# Patient Record
Sex: FEMALE | Race: WHITE
[De-identification: names, ages, dates, MRNs, and addresses within clinical notes are randomized per-mention and may not be internally consistent; named-entity substitution may affect disease eponyms.]

---

## 2017-03-16 ENCOUNTER — HOSPITAL ENCOUNTER (OUTPATIENT)
Dept: HOSPITAL 62 - SC | Age: 57
Discharge: HOME | End: 2017-03-16
Attending: OPHTHALMOLOGY
Payer: COMMERCIAL

## 2017-03-16 DIAGNOSIS — Z79.1: ICD-10-CM

## 2017-03-16 DIAGNOSIS — H25.813: Primary | ICD-10-CM

## 2017-03-16 DIAGNOSIS — Z79.84: ICD-10-CM

## 2017-03-16 DIAGNOSIS — Z88.8: ICD-10-CM

## 2017-03-16 DIAGNOSIS — M19.90: ICD-10-CM

## 2017-03-16 DIAGNOSIS — E11.9: ICD-10-CM

## 2017-03-16 PROCEDURE — 66984 XCAPSL CTRC RMVL W/O ECP: CPT

## 2017-03-16 PROCEDURE — 08RJ3JZ REPLACEMENT OF RIGHT LENS WITH SYNTHETIC SUBSTITUTE, PERCUTANEOUS APPROACH: ICD-10-PCS | Performed by: OPHTHALMOLOGY

## 2017-03-16 PROCEDURE — V2632 POST CHMBR INTRAOCULAR LENS: HCPCS

## 2017-03-16 PROCEDURE — 82962 GLUCOSE BLOOD TEST: CPT

## 2017-03-16 RX ADMIN — BESIFLOXACIN PRN DROP: 6 SUSPENSION OPHTHALMIC at 08:58

## 2017-03-16 RX ADMIN — TROPICAMIDE PRN DROP: 10 SOLUTION/ DROPS OPHTHALMIC at 08:58

## 2017-03-16 RX ADMIN — SODIUM CHONDROITIN SULFATE / SODIUM HYALURONATE ONE EACH: 0.55-0.5 INJECTION INTRAOCULAR at 09:51

## 2017-03-16 RX ADMIN — SODIUM CHONDROITIN SULFATE / SODIUM HYALURONATE ONE EACH: 0.55-0.5 INJECTION INTRAOCULAR at 00:00

## 2017-03-16 RX ADMIN — CYCLOPENTOLATE HYDROCHLORIDE AND PHENYLEPHRINE HYDROCHLORIDE PRN DROP: 2; 10 SOLUTION/ DROPS OPHTHALMIC at 08:58

## 2017-03-16 RX ADMIN — TROPICAMIDE PRN DROP: 10 SOLUTION/ DROPS OPHTHALMIC at 08:51

## 2017-03-16 RX ADMIN — BESIFLOXACIN PRN DROP: 6 SUSPENSION OPHTHALMIC at 00:00

## 2017-03-16 RX ADMIN — TETRACAINE HYDROCHLORIDE PRN DROP: 5 SOLUTION OPHTHALMIC at 09:32

## 2017-03-16 RX ADMIN — CYCLOPENTOLATE HYDROCHLORIDE AND PHENYLEPHRINE HYDROCHLORIDE PRN DROP: 2; 10 SOLUTION/ DROPS OPHTHALMIC at 09:07

## 2017-03-16 RX ADMIN — TROPICAMIDE PRN DROP: 10 SOLUTION/ DROPS OPHTHALMIC at 09:07

## 2017-03-16 RX ADMIN — BESIFLOXACIN PRN DROP: 6 SUSPENSION OPHTHALMIC at 09:59

## 2017-03-16 RX ADMIN — TETRACAINE HYDROCHLORIDE PRN DROP: 5 SOLUTION OPHTHALMIC at 09:22

## 2017-03-16 RX ADMIN — TETRACAINE HYDROCHLORIDE PRN DROP: 5 SOLUTION OPHTHALMIC at 08:51

## 2017-03-16 RX ADMIN — CYCLOPENTOLATE HYDROCHLORIDE AND PHENYLEPHRINE HYDROCHLORIDE PRN DROP: 2; 10 SOLUTION/ DROPS OPHTHALMIC at 08:51

## 2017-03-16 RX ADMIN — BESIFLOXACIN PRN DROP: 6 SUSPENSION OPHTHALMIC at 08:51

## 2017-03-16 NOTE — [facility] OPERATIVE REPORT E
[facility] Operative Report



NAME: CHUY CARRINGTON

                                      MRN: [id number]

                             AGE: 56Y

DATE OF SURGERY: 03/16/2017           ROOM:



PREOPERATIVE DIAGNOSIS:

CATARACT, RIGHT EYE.



POSTOPERATIVE DIAGNOSIS:

CATARACT, RIGHT EYE.



OPERATION:

Cataract extraction with intraocular lens implant of the right eye.



SURGEON:

MYRON PURCELL M.D.



ANESTHESIA:

Topical.



PROCEDURE:

After obtaining appropriate consent, the patient's right eye was prepped

and draped in sterile fashion as well as the surgeon in a sterile manner

and cataract surgery was started.  First a paracentesis blade was used to

make a small side-port incision.  Viscoelastic was used to inflate the

anterior chamber.  Next a 2.4 mm incision was made with the paracentesis

blade.  A continuous capsulorrhexis incision was made using a cystotome and

Utrata forceps.  Following this hydrodissection was carried out to make the

lens fully loose and mobile and it was rotated 90 degrees.  Following this,

a divide-and-conquer technique was used to phacoemulsify the lens with a

CDE of 8.95. The remaining cortex was removed with irrigation/aspiration. 

Provisc was instilled into the capsular bag to inflate the bag.  A SN60WF,

23.5 diopter lens was placed.  The remaining viscoelastic material was

removed with irrigation/aspiration.  Following this, a 10-0 nylon suture

was used to close the incision and it was found to be watertight.  Vigamox

was instilled in the eye and a protective shield was placed over the eye. 

The patient returned to the postoperative recovery in stable condition.





DICTATING PHYSICIAN: MYRON PURCELL M.D.



1284M              DT: 03/16/2017 1852

PHY#: 2011         DD: 03/16/2017 1751

ID:   [id number]               JOB#: [id number]       ACCT: [id number]



cc:MYRON PURCELL M.D.

>

## 2017-03-16 NOTE — DISCHARGE SUMMARY E
Discharge Summary



NAME: CHUY CARRINGTON

MRN:  [id number]        : 1960     AGE: 56Y

ADMITTED: 2017                  DISCHARGED: 2017



REASON FOR ADMISSION:

This is a 56-year-old female who underwent cataract extraction of the

right eye.



DIAGNOSIS:

Cataract, right eye.



HOSPITAL COURSE:

She underwent surgery because she has glare from headlights at night

making it difficult to drive, trouble seeing small print.  She should be

on a regular diet.  No bending at her waist.  No heavy lifting.  She

should use her Besivance, Ilevro, and Durezol at 3:00 p.m. and 8:00 p.m.

and sleep with a rigid shield, and I will see her for a 1-day

postoperative tomorrow.





DICTATING PHYSICIAN:  MYRON PURCELL M.D.





1284M                  DT: 2017    1853

PHY#: 2011            DD: 2017    1751

ID:   [id number]           JOB#: [id number]      ACCT: [id number]



cc:MYRON PURCELL M.D.

>

## 2017-04-11 ENCOUNTER — HOSPITAL ENCOUNTER (OUTPATIENT)
Dept: HOSPITAL 62 - SC | Age: 57
Discharge: HOME | End: 2017-04-11
Attending: OPHTHALMOLOGY
Payer: COMMERCIAL

## 2017-04-11 DIAGNOSIS — Z96.1: ICD-10-CM

## 2017-04-11 DIAGNOSIS — E11.9: ICD-10-CM

## 2017-04-11 DIAGNOSIS — M19.90: ICD-10-CM

## 2017-04-11 DIAGNOSIS — H25.812: Primary | ICD-10-CM

## 2017-04-11 DIAGNOSIS — Z79.84: ICD-10-CM

## 2017-04-11 PROCEDURE — 66984 XCAPSL CTRC RMVL W/O ECP: CPT

## 2017-04-11 PROCEDURE — V2632 POST CHMBR INTRAOCULAR LENS: HCPCS

## 2017-04-11 PROCEDURE — 08RK3JZ REPLACEMENT OF LEFT LENS WITH SYNTHETIC SUBSTITUTE, PERCUTANEOUS APPROACH: ICD-10-PCS | Performed by: OPHTHALMOLOGY

## 2017-04-11 PROCEDURE — 82962 GLUCOSE BLOOD TEST: CPT

## 2017-04-11 RX ADMIN — CYCLOPENTOLATE HYDROCHLORIDE AND PHENYLEPHRINE HYDROCHLORIDE PRN DROP: 2; 10 SOLUTION/ DROPS OPHTHALMIC at 07:44

## 2017-04-11 RX ADMIN — SODIUM CHONDROITIN SULFATE / SODIUM HYALURONATE ONE EACH: 0.55-0.5 INJECTION INTRAOCULAR at 08:31

## 2017-04-11 RX ADMIN — TETRACAINE HYDROCHLORIDE PRN DROP: 5 SOLUTION OPHTHALMIC at 07:43

## 2017-04-11 RX ADMIN — CYCLOPENTOLATE HYDROCHLORIDE AND PHENYLEPHRINE HYDROCHLORIDE PRN DROP: 2; 10 SOLUTION/ DROPS OPHTHALMIC at 07:56

## 2017-04-11 RX ADMIN — TETRACAINE HYDROCHLORIDE PRN DROP: 5 SOLUTION OPHTHALMIC at 08:22

## 2017-04-11 RX ADMIN — EPINEPHRINE ONE MG: 1 INJECTION, SOLUTION, CONCENTRATE INTRAVENOUS at 00:00

## 2017-04-11 RX ADMIN — BESIFLOXACIN PRN DROP: 6 SUSPENSION OPHTHALMIC at 08:12

## 2017-04-11 RX ADMIN — TETRACAINE HYDROCHLORIDE PRN DROP: 5 SOLUTION OPHTHALMIC at 08:12

## 2017-04-11 RX ADMIN — BESIFLOXACIN PRN DROP: 6 SUSPENSION OPHTHALMIC at 08:47

## 2017-04-11 RX ADMIN — BESIFLOXACIN PRN DROP: 6 SUSPENSION OPHTHALMIC at 00:00

## 2017-04-11 RX ADMIN — HEPARIN SODIUM ONE ML: 1000 INJECTION, SOLUTION INTRAVENOUS; SUBCUTANEOUS at 08:31

## 2017-04-11 RX ADMIN — CYCLOPENTOLATE HYDROCHLORIDE AND PHENYLEPHRINE HYDROCHLORIDE PRN DROP: 2; 10 SOLUTION/ DROPS OPHTHALMIC at 08:12

## 2017-04-11 RX ADMIN — TROPICAMIDE PRN DROP: 10 SOLUTION/ DROPS OPHTHALMIC at 07:56

## 2017-04-11 RX ADMIN — EPINEPHRINE ONE MG: 1 INJECTION, SOLUTION, CONCENTRATE INTRAVENOUS at 08:31

## 2017-04-11 RX ADMIN — HEPARIN SODIUM ONE ML: 1000 INJECTION, SOLUTION INTRAVENOUS; SUBCUTANEOUS at 00:00

## 2017-04-11 RX ADMIN — TROPICAMIDE PRN DROP: 10 SOLUTION/ DROPS OPHTHALMIC at 07:44

## 2017-04-11 RX ADMIN — BESIFLOXACIN PRN DROP: 6 SUSPENSION OPHTHALMIC at 07:45

## 2017-04-11 RX ADMIN — TROPICAMIDE PRN DROP: 10 SOLUTION/ DROPS OPHTHALMIC at 08:11

## 2017-04-11 RX ADMIN — SODIUM CHONDROITIN SULFATE / SODIUM HYALURONATE ONE EACH: 0.55-0.5 INJECTION INTRAOCULAR at 00:00

## 2017-04-11 NOTE — [facility] DISCHARGE SUMMARY E
[facility] Discharge Summary



NAME: CHUY CARRINGTON

                                      MRN: [id number]

                                AGE: 56Y

ADMITTED: 04/11/2017           DISCHARGED: 04/11/2017



HOSPITAL COURSE:

This is a 56-year-old female who underwent cataract extraction of her left

eye.



DIAGNOSIS:

Cataract, left eye.



INDICATIONS:

She underwent surgery because she was having difficulty feeling unbalanced

since having her right surgery.  She also had trouble seeing the TV and

reading.



DISCHARGE INSTRUCTIONS:

She should be on a regular diet.  No bending at her waist.  No heavy

lifting.  She should use her Besivance, Ilevro, and Durezol at 3 p.m. and 8

p.m. and sleep with a rigid shield.  I will see her for her 1 day

postoperative tomorrow.

 







DICTATING PHYSICIAN: MYRON PURCELL M.D.



1211M              DT: 04/11/2017 1217

Y#: 2011         DD: 04/11/2017 1216

ID:   [id number]               JOB#: [id number]       ACCT: [id number]



cc:MYRON PURCELL M.D.

>

## 2017-04-11 NOTE — [facility]PERAT[facility] REPORT E
[facility] Operative Report



NAME: CHUY CARRINGTON

                                      MRN: [id number]

                             AGE: 56Y

DATE OF SURGERY: 04/11/2017           ROOM:



PREOPERATIVE DIAGNOSIS:

Cataract, left eye.



POSTOPERATIVE DIAGNOSIS:

Cataract, left eye.



OPERATION:

Cataract extraction with intraocular lens implant of the left eye.



SURGEON:

MYRON PURCELL M.D.



ANESTHESIA:

Topical.



PROCEDURE:

After obtaining appropriate consent, the patient's left eye was prepped and

draped in sterile fashion as well as the surgeon in a sterile manner and

cataract surgery was started.  First a paracentesis blade was used to make

a small side-port incision.  Viscoelastic was used to inflate the anterior

chamber.  Next a 2.4 mm incision was made with the paracentesis blade.  A

continuous capsulorrhexis incision was made using a cystotome and Utrata

forceps.  Following this hydrodissection was carried out to make the lens

fully loose and mobile and it was rotated 90 degrees.  Following this, a

divide-and-conquer technique was used to phacoemulsify the lens with a CDE

of 7.36. The remaining cortex was removed with irrigation/aspiration. 

Provisc was instilled into the capsular bag to inflate the bag.  A SN60WF,

23.5 diopter lens was placed.  The remaining viscoelastic material was

removed with irrigation/aspiration.  Following this, a 10-0 nylon suture

was used to close the incision and it was found to be watertight.  Vigamox

was instilled in the eye and a protective shield was placed over the eye. 

The patient returned to the postoperative recovery in stable condition.









DICTATING PHYSICIAN: MYRON PURCELL M.D.



1211M              DT: 04/11/2017 1216

Y#: 2011         DD: 04/11/2017 1216

ID:   [id number]               JOB#: [id number]       ACCT: [id number]



cc:MYRON PURCELL M.D.

>

## 2018-03-16 ENCOUNTER — HOSPITAL ENCOUNTER (OUTPATIENT)
Dept: HOSPITAL 62 - RAD | Age: 58
End: 2018-03-16
Attending: SPECIALIST
Payer: COMMERCIAL

## 2018-03-16 DIAGNOSIS — M54.12: Primary | ICD-10-CM

## 2018-03-16 PROCEDURE — 72040 X-RAY EXAM NECK SPINE 2-3 VW: CPT

## 2018-03-16 NOTE — RADIOLOGY REPORT (SQ)
EXAM DESCRIPTION:  CERV SP 3 VIEW OR LESS



COMPLETED DATE/TIME:  3/16/2018 7:48 am



REASON FOR STUDY:  CERVICAL RADICULOPATHY (M54.12) M54.12  RADICULOPATHY, CERVICAL REGION



COMPARISON:  None.



NUMBER OF VIEWS:  Two view.



TECHNIQUE:  Lateral views of the cervical spine with flexion and extension.



LIMITATIONS:  None.



FINDINGS:  MINERALIZATION: Normal.

ALIGNMENT: Anatomic.

FLEXION/EXTENSION: No instability.

VERTEBRAE: Vertebral bodies of normal height.

DISCS: No significant osteophytes or sclerosis. Disc height maintained.

LATERAL AND POSTERIOR ELEMENTS: Facets, lateral masses, and spinous processes without significant fin
dings.

HARDWARE: Hardware from C4 to C7.

SOFT TISSUES: No masses or calcifications. Lung apices clear.

OTHER: No other significant finding.



IMPRESSION:  NO SIGNIFICANT FINDING IN THE SPINE.

NO INSTABILITY ON FLEXION/EXTENSION.



TECHNICAL DOCUMENTATION:  JOB ID:  [id number]

 2011 [employer]- All Rights Reserved



Reading location - IP/workstation name: [email or web address]

## 2018-06-21 ENCOUNTER — HOSPITAL ENCOUNTER (OUTPATIENT)
Dept: HOSPITAL 62 - RAD | Age: 58
End: 2018-06-21
Attending: SPECIALIST
Payer: COMMERCIAL

## 2018-06-21 DIAGNOSIS — M54.12: Primary | ICD-10-CM

## 2018-06-21 PROCEDURE — 72040 X-RAY EXAM NECK SPINE 2-3 VW: CPT

## 2018-06-21 NOTE — RADIOLOGY REPORT (SQ)
EXAM DESCRIPTION:  CERV SP 3 VIEW OR LESS



COMPLETED DATE/TIME:  6/21/2018 7:17 am



REASON FOR STUDY:  **AP FLEX/EXT ONLY** CERVICAL RADICULOPATHY (M54.12) M54.12  RADICULOPATHY, CERVIC
AL REGION



COMPARISON:  3/16/2018



NUMBER OF VIEWS:  AP and lateral neutral films, lateral flexion, lateral extension.  Four views total
.



TECHNIQUE:  AP and lateral neutral films, lateral flexion, lateral extension.  Four views total.



LIMITATIONS:  None.



FINDINGS:  MINERALIZATION: Normal.

ALIGNMENT: Anatomic.  No instability on flexion/extension

VERTEBRAE: Vertebral bodies of normal height.

DISCS: Post fusion with anterior fixation plate and metallic disc spacers from C4 through C7.

HARDWARE: No lucency around the hardware worrisome for loosening.

SOFT TISSUES: No masses or calcifications. Lung apices clear.  No prevertebral soft tissue swelling.

OTHER: No other significant finding.



IMPRESSION:  Post fusion from C4 through C7.  No lucency around the hardware worrisome for loosening.
  No instability on flexion/ extension.



TECHNICAL DOCUMENTATION:  JOB ID:  [id number]

 2011 [employer]- All Rights Reserved



Reading location - IP/workstation name: [email or web address]